# Patient Record
(demographics unavailable — no encounter records)

---

## 2025-04-22 NOTE — CONSULT LETTER
[Dear  ___] : Dear  [unfilled], [Consult Letter:] : I had the pleasure of evaluating your patient, [unfilled]. [Please see my note below.] : Please see my note below. [Consult Closing:] : Thank you very much for allowing me to participate in the care of this patient.  If you have any questions, please do not hesitate to contact me. [Sincerely,] : Sincerely, [FreeTextEntry2] : Ryan Rizo MD [FreeTextEntry1] : I will keep you informed of the final pathology. [FreeTextEntry3] : Armando Villalba MD FACS Chief of Surgical Oncology

## 2025-04-22 NOTE — ASSESSMENT
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

## 2025-04-22 NOTE — HISTORY OF PRESENT ILLNESS
[de-identified] : Ms. STEVAN CESAR is a 67-year-old woman, referred by Dr. Ryan Rizo for consultation regarding a left temple BCC, here for an initial visit. This has been present for many years but has slowly been increasing in size which led to her evaluation by Dr. Rizo. she has never had any prior skin cancers.  shave biopsy 7/2024  - left temple: pigmented BCC, nodular type

## 2025-04-22 NOTE — PHYSICAL EXAM
[Normal] : supple, no neck mass and thyroid not enlarged [Normal Neck Lymph Nodes] : normal neck lymph nodes  [Normal Supraclavicular Lymph Nodes] : normal supraclavicular lymph nodes [Normal Groin Lymph Nodes] : normal groin lymph nodes [Normal Axillary Lymph Nodes] : normal axillary lymph nodes [Normal] : oriented to person, place and time, with appropriate affect [de-identified] : Normal parotid nodes [de-identified] : 3 cm pigmented lesion of the left temple area without ulceration.

## 2025-04-22 NOTE — HISTORY OF PRESENT ILLNESS
[de-identified] : Ms. STEVAN CESAR is a 67-year-old woman, referred by Dr. Ryan Rizo for consultation regarding a left temple BCC, here for an initial visit. This has been present for many years but has slowly been increasing in size which led to her evaluation by Dr. Rizo. she has never had any prior skin cancers.  shave biopsy 7/2024  - left temple: pigmented BCC, nodular type

## 2025-04-22 NOTE — PHYSICAL EXAM
[Normal] : supple, no neck mass and thyroid not enlarged [Normal Neck Lymph Nodes] : normal neck lymph nodes  [Normal Supraclavicular Lymph Nodes] : normal supraclavicular lymph nodes [Normal Groin Lymph Nodes] : normal groin lymph nodes [Normal Axillary Lymph Nodes] : normal axillary lymph nodes [Normal] : oriented to person, place and time, with appropriate affect [de-identified] : Normal parotid nodes [de-identified] : 3 cm pigmented lesion of the left temple area without ulceration.

## 2025-04-22 NOTE — ASSESSMENT
[FreeTextEntry1] : IMP: 68yo F w/ left temple BCC (bx 7/2024)  shave biopsy 7/2024  - left temple: pigmented BCC, nodular type   PLAN: WLE of temple lesion with skin graft closure

## 2025-05-28 NOTE — HISTORY OF PRESENT ILLNESS
[FreeTextEntry1] : The patient is a 68 year old female here today for a post op visit s/p closure of left temple wound (DOS: 4/30/2025). Patient states she is feeling well and has been continuing her daily wound care. Patient has no concerns or complaints at this time. Patient denies fever, chills, drainage, or redness.

## 2025-05-28 NOTE — PHYSICAL EXAM
[de-identified] : L temple: full thickness skin graft healing excellent. No signs of erythema, infection, or breakdown

## 2025-05-28 NOTE — PHYSICAL EXAM
[de-identified] : L temple: full thickness skin graft healing excellent. No signs of erythema, infection, or breakdown

## 2025-06-25 NOTE — PHYSICAL EXAM
[de-identified] : L temple: full thickness skin graft healing excellent. No signs of erythema, infection, or breakdown [de-identified] : Incision well healed

## 2025-06-25 NOTE — HISTORY OF PRESENT ILLNESS
[FreeTextEntry1] : The patient is a 68 year old female here today for a post op visit s/p closure of left temple wound (DOS: 4/30/2025). Patient states she is feeling great. Patient has questions regarding the coloration of the graft, the tightness of the skin around her eye, and whether or not she can color her hair yet. Otherwise, patient has no other concerns or complaints at this time. Patient denies fever, chills, drainage, or redness.

## 2025-06-25 NOTE — REASON FOR VISIT
[Post Op: _________] : a [unfilled] post op visit [FreeTextEntry1] : DOS:04/30/25 S/P: Closure of left temple wound. The patient is a 68-year-old female here today for a post-op visit. The patient denies any fever, drainage, itching, chills or bleeding. The patient states she has no major concerns/complaints at this time.